# Patient Record
Sex: FEMALE | HISPANIC OR LATINO | URBAN - NONMETROPOLITAN AREA
[De-identification: names, ages, dates, MRNs, and addresses within clinical notes are randomized per-mention and may not be internally consistent; named-entity substitution may affect disease eponyms.]

---

## 2023-10-10 ENCOUNTER — APPOINTMENT (RX ONLY)
Dept: URBAN - NONMETROPOLITAN AREA CLINIC 13 | Facility: CLINIC | Age: 53
Setting detail: DERMATOLOGY
End: 2023-10-10

## 2023-10-10 DIAGNOSIS — L81.1 CHLOASMA: ICD-10-CM

## 2023-10-10 PROCEDURE — ? COUNSELING

## 2023-10-10 PROCEDURE — 99203 OFFICE O/P NEW LOW 30 MIN: CPT

## 2023-10-10 PROCEDURE — ? TREATMENT REGIMEN

## 2023-10-10 NOTE — PROCEDURE: TREATMENT REGIMEN
Plan: Pt recently bought 4% hydrocortisone and 0.5% tretinoin from a spa in [facility]. Plan to use products she has purchased and follow up in 1-2 months. Discussed spa and FRAXEL pt will consider at follow up
Detail Level: Zone